# Patient Record
Sex: FEMALE | Race: AMERICAN INDIAN OR ALASKA NATIVE
[De-identification: names, ages, dates, MRNs, and addresses within clinical notes are randomized per-mention and may not be internally consistent; named-entity substitution may affect disease eponyms.]

---

## 2021-12-01 ENCOUNTER — HOSPITAL ENCOUNTER (EMERGENCY)
Dept: HOSPITAL 5 - ED | Age: 21
LOS: 1 days | Discharge: HOME | End: 2021-12-02
Payer: SELF-PAY

## 2021-12-01 VITALS — SYSTOLIC BLOOD PRESSURE: 102 MMHG | DIASTOLIC BLOOD PRESSURE: 55 MMHG

## 2021-12-01 DIAGNOSIS — Z79.899: ICD-10-CM

## 2021-12-01 DIAGNOSIS — N39.0: ICD-10-CM

## 2021-12-01 DIAGNOSIS — B37.3: ICD-10-CM

## 2021-12-01 DIAGNOSIS — B35.6: Primary | ICD-10-CM

## 2021-12-01 DIAGNOSIS — Z98.890: ICD-10-CM

## 2021-12-01 PROCEDURE — 81025 URINE PREGNANCY TEST: CPT

## 2021-12-01 PROCEDURE — 87076 CULTURE ANAEROBE IDENT EACH: CPT

## 2021-12-01 PROCEDURE — 87186 SC STD MICRODIL/AGAR DIL: CPT

## 2021-12-01 PROCEDURE — 87086 URINE CULTURE/COLONY COUNT: CPT

## 2021-12-01 PROCEDURE — 99283 EMERGENCY DEPT VISIT LOW MDM: CPT

## 2021-12-01 PROCEDURE — 81001 URINALYSIS AUTO W/SCOPE: CPT

## 2021-12-02 LAB
BACTERIA #/AREA URNS HPF: (no result) /HPF
BILIRUB UR QL STRIP: (no result)
BLOOD UR QL VISUAL: (no result)
MUCOUS THREADS #/AREA URNS HPF: (no result) /HPF
PH UR STRIP: 5 [PH] (ref 5–7)
RBC #/AREA URNS HPF: 5 /HPF (ref 0–6)
UROBILINOGEN UR-MCNC: < 2 MG/DL (ref ?–2)
WBC #/AREA URNS HPF: 63 /HPF (ref 0–6)

## 2021-12-02 NOTE — EMERGENCY DEPARTMENT REPORT
ED Female  HPI





- General


Chief complaint: Urogenital-Female


Stated complaint: UTI


Source: patient


Mode of arrival: Ambulatory


Limitations: No Limitations





- History of Present Illness


Initial comments: 





Patient is a A1 20-year-old -American female with no past medical 

history who presents to the ED with complaint of acute onset persistent itchy 

dry scaly rashes in the perineal and vaginal area and dysuria with urinary 

frequency and urgency for the last 1 week.  Patient states that she is not 

sexually active especially in the last 5 months.  Patient denies dizziness, 

syncope, fever, chills, vaginal bleeding, vaginal discharge, low back pain, 

abdominal pain, chest pain or shortness of breath, nausea and vomiting or 

diarrhea.


MD Complaint: dysuria, other (vaginal irritation)


-: Sudden, week(s) (1)


Location: labia, perineum, other (vaginal)


Radiation: non-radiating


Severity: moderate


Severity scale (0 -10): 4


Quality: burning, aching, other (itching)


Consistency: constant


Improves with: none


Worsens with: urination


Are you Pregnant Now?: No


Last Menstrual Period: 21


EDC: 20


Associated Symptoms: denies other symptoms, dysuria, rash (itchy perineal 

irritation).  denies: vaginal discharge, vaginal bleeding, abdominal pain, 

nausea/vomiting, fever/chills, headaches, loss of appetite, hematuria, shortness

of breath, syncope, weakness





- Related Data


Sexually active: Yes


: 1


Para: 0


A: 1


                                  Previous Rx's











 Medication  Instructions  Recorded  Last Taken  Type


 


Fluconazole [Diflucan TAB] 200 mg PO QDAY #2 tablet 21 Unknown Rx


 


Ibuprofen [Motrin] 400 mg PO Q8H PRN #24 tablet 21 Unknown Rx


 


Nystatin Oint [Mycostatin Oint] 1 applicatio TP BID #1 tube 21 Unknown Rx


 


cephALEXin [Keflex] 500 mg PO Q8HR #30 cap 21 Unknown Rx














ED Review of Systems


ROS: 


Stated complaint: UTI


Other details as noted in HPI





Constitutional: denies: chills, fever


Eyes: denies: eye pain, eye discharge, vision change


ENT: denies: ear pain, throat pain


Respiratory: denies: cough, shortness of breath, wheezing


Cardiovascular: denies: chest pain, palpitations


Endocrine: no symptoms reported


Gastrointestinal: denies: abdominal pain, nausea, diarrhea


Genitourinary: dysuria, other (Itchy vaginal rash in the perineal area).  

denies: urgency, discharge


Musculoskeletal: denies: back pain, joint swelling, arthralgia


Skin: rash (Itchy dry scaly rashes in the perineal and vaginal area), pruritus. 

 denies: lesions


Neurological: denies: headache, weakness, paresthesias


Psychiatric: denies: anxiety, depression


Hematological/Lymphatic: denies: easy bleeding, easy bruising





ED Past Medical Hx





- Past Medical History


Previous Medical History?: No





- Surgical History


Past Surgical History?: Yes





- Medications


Home Medications: 


                                Home Medications











 Medication  Instructions  Recorded  Confirmed  Last Taken  Type


 


Fluconazole [Diflucan TAB] 200 mg PO QDAY #2 tablet 21  Unknown Rx


 


Ibuprofen [Motrin] 400 mg PO Q8H PRN #24 tablet 21  Unknown Rx


 


Nystatin Oint [Mycostatin Oint] 1 applicatio TP BID #1 tube 21  Unknown Rx


 


cephALEXin [Keflex] 500 mg PO Q8HR #30 cap 21  Unknown Rx














ED Physical Exam





- General


Limitations: No Limitations


General appearance: alert, in no apparent distress





- Head


Head exam: Present: atraumatic, normocephalic, normal inspection





- Eye


Eye exam: Present: normal appearance, PERRL, EOMI


Pupils: Present: normal accommodation





- ENT


ENT exam: Present: normal exam, normal orophraynx, mucous membranes moist, TM's 

normal bilaterally, normal external ear exam





- Neck


Neck exam: Present: normal inspection, full ROM





- Respiratory


Respiratory exam: Present: normal lung sounds bilaterally.  Absent: respiratory 

distress, wheezes, rales, rhonchi, stridor, chest wall tenderness, accessory 

muscle use, decreased breath sounds, prolonged expiratory





- Cardiovascular


Cardiovascular Exam: Present: regular rate, normal rhythm, normal heart sounds. 

 Absent: systolic murmur, diastolic murmur, rubs, gallop





- GI/Abdominal


GI/Abdominal exam: Present: soft, normal bowel sounds.  Absent: tenderness, 

guarding, rebound, hyperactive bowel sounds, hypoactive bowel sounds, 

organomegaly





- 


External exam: Present: other (Dry scaly rashes in the perineal and the labia 

areas)


Bi-manual exam: Present: other (Female ED technician Ms. Medellin present as a 

chaperone)





- Extremities Exam


Extremities exam: Present: normal inspection, full ROM, normal capillary refill





- Back Exam


Back exam: Present: normal inspection, full ROM.  Absent: tenderness, CVA 

tenderness (R), CVA tenderness (L), muscle spasm, paraspinal tenderness





- Neurological Exam


Neurological exam: Present: alert, oriented X3, CN II-XII intact, normal gait, 

reflexes normal





- Psychiatric


Psychiatric exam: Present: normal affect, normal mood





- Skin


Skin exam: Present: warm, dry, intact, normal color, rash (Dry scaly rashes in 

the perineum and in the labia)





ED Course


                                   Vital Signs











  21





  22:37


 


Temperature 98.2 F


 


Pulse Rate 75


 


Respiratory 15





Rate 


 


Blood Pressure 102/55





[Left] 


 


O2 Sat by Pulse 99





Oximetry 














ED Medical Decision Making





- Medical Decision Making





This is a A1 20-year-old -American female with no past medical 

history who presents to the ED with complaint of acute onset persistent itchy 

dry scaly rashes in the perineal and vaginal area and dysuria with urinary 

frequency and urgency for the last 1 week.  Patient states that she is not 

sexually active especially in the last 5 months.  In the ED, patient is alert 

and oriented x3 and is not in any distress.  Urinalysis showed significant 

urinary tract infection with multiple budding yeasts.  Patient was therefore 

discharged home on medications and advised to follow-up with her primary care 

physician in 7 to 10 days for reevaluation.  Patient was advised return to the 

ED immediately if symptoms get worse.





- Differential Diagnosis


Jock itch; UTI; BV; pregnancy; STD;


Critical care attestation.: 


If time is entered above; I have spent that time in minutes in the direct care 

of this critically ill patient, excluding procedure time.








ED Disposition


Clinical Impression: 


 Tinea cruris, Acute urinary tract infection, Candidal vaginitis





Disposition:  HOME / SELF CARE / HOMELESS


Is pt being admited?: No


Does the pt Need Aspirin: No


Condition: Stable


Instructions:  Urinary Tract Infection, Adult, Easy-to-Read, Vaginitis, 

Easy-to-Read, Vaginal Yeast Infection, Adult, Skin Yeast Infection


Additional Instructions: 


Your urinalysis test showed significant urinary tract infection and budding 

yeasts.  Therefore take medication with food, drink plenty of fluids and follow-

up with your primary care physician in 7 to 10 days for reevaluation.  Return to

 the ED immediately if symptoms get worse.


Prescriptions: 


Fluconazole [Diflucan TAB] 200 mg PO QDAY #2 tablet


cephALEXin [Keflex] 500 mg PO Q8HR #30 cap


Ibuprofen [Motrin] 400 mg PO Q8H PRN #24 tablet


 PRN Reason: Pain , Severe (7-10)


Nystatin Oint [Mycostatin Oint] 1 applicatio TP BID #1 tube


Referrals: 


PRIMARY CARE,MD [Primary Care Provider] - 3-5 Days


[facility] [Provider Group] - 7-10 days


Forms:  Work/School Release Form(ED)


Time of Disposition: 04:23


Print Language: ENGLISH